# Patient Record
Sex: FEMALE | Race: WHITE
[De-identification: names, ages, dates, MRNs, and addresses within clinical notes are randomized per-mention and may not be internally consistent; named-entity substitution may affect disease eponyms.]

---

## 2020-10-16 ENCOUNTER — HOSPITAL ENCOUNTER (EMERGENCY)
Dept: HOSPITAL 11 - JP.ED | Age: 60
Discharge: HOME | End: 2020-10-16
Payer: COMMERCIAL

## 2020-10-16 VITALS — HEART RATE: 85 BPM | DIASTOLIC BLOOD PRESSURE: 96 MMHG | SYSTOLIC BLOOD PRESSURE: 125 MMHG

## 2020-10-16 DIAGNOSIS — Z90.710: ICD-10-CM

## 2020-10-16 DIAGNOSIS — R09.1: Primary | ICD-10-CM

## 2020-10-16 DIAGNOSIS — F17.210: ICD-10-CM

## 2020-10-16 DIAGNOSIS — Z79.899: ICD-10-CM

## 2020-10-16 DIAGNOSIS — R07.89: ICD-10-CM

## 2020-10-16 DIAGNOSIS — Z88.0: ICD-10-CM

## 2020-10-16 DIAGNOSIS — H55.89: ICD-10-CM

## 2020-10-16 DIAGNOSIS — F32.9: ICD-10-CM

## 2020-10-16 DIAGNOSIS — Z90.49: ICD-10-CM

## 2020-10-16 PROCEDURE — 36415 COLL VENOUS BLD VENIPUNCTURE: CPT

## 2020-10-16 PROCEDURE — 96374 THER/PROPH/DIAG INJ IV PUSH: CPT

## 2020-10-16 PROCEDURE — 93005 ELECTROCARDIOGRAM TRACING: CPT

## 2020-10-16 PROCEDURE — 80053 COMPREHEN METABOLIC PANEL: CPT

## 2020-10-16 PROCEDURE — 84484 ASSAY OF TROPONIN QUANT: CPT

## 2020-10-16 PROCEDURE — 71046 X-RAY EXAM CHEST 2 VIEWS: CPT

## 2020-10-16 PROCEDURE — 85379 FIBRIN DEGRADATION QUANT: CPT

## 2020-10-16 PROCEDURE — 85025 COMPLETE CBC W/AUTO DIFF WBC: CPT

## 2020-10-16 PROCEDURE — 99285 EMERGENCY DEPT VISIT HI MDM: CPT

## 2020-10-16 NOTE — EDM.PDOC
ED HPI GENERAL MEDICAL PROBLEM





- General


Chief Complaint: Chest Pain


Stated Complaint: CHEST PAIN, SHORTNESS OF BREATH, ARM PAIN


Time Seen by Provider: 10/16/20 16:00


Source of Information: Reports: Patient, Family


History Limitations: Reports: No Limitations





- History of Present Illness


INITIAL COMMENTS - FREE TEXT/NARRATIVE: 





60-year-old female who over the past 12 hours has developed a pleuritic-like 

pain radiating up through her chest into her shoulders, substernal area, and 

into the back of her neck.  It is very painful to sit up or lay down, if she 

lays still it is better.  It also hurts to take a deep breath.  She has no fever

or chills, no shortness of breath or diaphoresis.  Some pain radiates into the 

left arm.  No palpitations.  Denies abdominal pain.  No recent trauma, she does 

have some increase in activity recently.


Onset: Gradual


Duration: Hour(s): (12 hours)


Location: Reports: Neck, Chest, Other (Shoulders, left arm)


Worsens with: Reports: Breathing, Movement


Associated Symptoms: Reports: Chest Pain, Other (Pleuritic pain but not short of

breath).  Denies: Cough, Malaise, Nausea/Vomiting, Shortness of Breath, Weakness





- Related Data


                                    Allergies











Allergy/AdvReac Type Severity Reaction Status Date / Time


 


Penicillins Allergy  Hives Verified 10/16/20 15:46











Home Meds: 


                                    Home Meds





Sertraline [Zoloft] 100 mg PO DAILY 01/03/16 [History]











Past Medical History


Other Musculoskeletal History: left wrist pain


Psychiatric History: Reports: Depression





- Past Surgical History


GI Surgical History: Reports: Cholecystectomy


Female  Surgical History: Reports: Hysterectomy


Musculoskeletal Surgical History: Reports: None, Shoulder Surgery





Social & Family History





- Tobacco Use


Tobacco Use Status *Q: Current Every Day Tobacco User


Years of Tobacco use: 15


Packs/Tins Daily: 0.5





- Recreational Drug Use


Recreational Drug Use: No





- Living Situation & Occupation


Living situation: Reports: , with Family





ED ROS GENERAL





- Review of Systems


Review Of Systems: See Below


Constitutional: Denies: Fever, Chills


HEENT: Reports: Other (Disconjugate gaze which is chronic, left lazy eye)


Respiratory: Reports: Pleuritic Chest Pain.  Denies: Shortness of Breath


Cardiovascular: Reports: Chest Pain, Palpitations


GI/Abdominal: Denies: Abdominal Pain, Nausea, Vomiting


Skin: Reports: No Symptoms.  Denies: Rash, Erythema


Neurological: Denies: Dizziness, Headache, Difficulty Walking


Psychiatric: Reports: No Symptoms





ED EXAM, GENERAL





- Physical Exam


Exam: See Below


Exam Limited By: No Limitations


General Appearance: Alert, Anxious, Mild Distress


Eye Exam: Bilateral Eye: Other (Disconjugate gaze)


Head: Atraumatic


Neck: Other (She does have tenderness to palpation of the trapezius areas, 

increased pain with sitting up or deep breath)


Respiratory/Chest: No Respiratory Distress, Lungs Clear, Other (No anterior 

chest wall pain to palpation)


Cardiovascular: Regular Rate, Rhythm


GI/Abdominal: Soft, Non-Tender


Extremities: Other (Full range of motion of the shoulders without pain)


Neurological: Alert, Oriented, No Motor/Sensory Deficits


Psychiatric: Anxious


Skin Exam: Warm, Dry


  ** #1 Interpretation


EKG Date: 10/16/20


Rhythm: NSR


P-Wave: Present (EKG is normal)


ST-T: Normal


QT: Normal





Course





- Vital Signs


Last Recorded V/S: 


                                Last Vital Signs











Temp  98.5 F   10/16/20 15:47


 


Pulse  85   10/16/20 17:08


 


Resp  17   10/16/20 17:08


 


BP  125/96 H  10/16/20 17:08


 


Pulse Ox  97   10/16/20 17:08














- Orders/Labs/Meds


Orders: 


                               Active Orders 24 hr











 Category Date Time Status


 


 Chest 2V [CR] Routine Exams  10/16/20 16:06 Taken


 


 EKG 12 Lead [EK] Routine Ther  10/16/20 16:20 Ordered











Labs: 


                                Laboratory Tests











  10/16/20 10/16/20 10/16/20 Range/Units





  16:19 16:19 16:19 


 


WBC  14.0 H    (4.5-11.0)  K/uL


 


RBC  4.46    (3.30-5.50)  M/uL


 


Hgb  13.5    (12.0-15.0)  g/dL


 


Hct  40.8    (36.0-48.0)  %


 


MCV  92    (80-98)  fL


 


MCH  30    (27-31)  pg


 


MCHC  33    (32-36)  %


 


Plt Count  434 H    (150-400)  K/uL


 


Neut % (Auto)  77 H    (36-66)  %


 


Lymph % (Auto)  10 L    (24-44)  %


 


Mono % (Auto)  10 H    (2-6)  %


 


Eos % (Auto)  2    (2-4)  %


 


Baso % (Auto)  1    (0-1)  %


 


D-Dimer, Quantitative    842 H  (0.0-400.0)  ng/mL


 


Sodium   134 L   (140-148)  mmol/L


 


Potassium   4.1   (3.6-5.2)  mmol/L


 


Chloride   101   (100-108)  mmol/L


 


Carbon Dioxide   22   (21-32)  mmol/L


 


Anion Gap   15.1 H   (5.0-14.0)  mmol/L


 


BUN   16   (7-18)  mg/dL


 


Creatinine   1.0   (0.6-1.0)  mg/dL


 


Est Cr Clr Drug Dosing   53.16   mL/min


 


Estimated GFR (MDRD)   56 L   (>60)  


 


Glucose   146 H   ()  mg/dL


 


Calcium   8.4 L   (8.5-10.1)  mg/dL


 


Total Bilirubin   0.4   (0.2-1.0)  mg/dL


 


AST   17   (15-37)  U/L


 


ALT   37   (12-78)  U/L


 


Alkaline Phosphatase   63   ()  U/L


 


Troponin I   < 0.017   (0.000-0.056)  ng/mL


 


Total Protein   7.3   (6.4-8.2)  g/dL


 


Albumin   2.8 L   (3.4-5.0)  g/dL


 


Globulin   4.5 H   (2.3-3.5)  g/dL


 


Albumin/Globulin Ratio   0.6 L   (1.2-2.2)  











Meds: 


Medications














Discontinued Medications














Generic Name Dose Route Start Last Admin





  Trade Name Colinq  PRN Reason Stop Dose Admin


 


Ketorolac Tromethamine  30 mg  10/16/20 16:07  10/16/20 16:36





  Toradol  IVPUSH  10/16/20 16:08  30 mg





  ONETIME ONE   Administration














- Re-Assessments/Exams


Free Text/Narrative Re-Assessment/Exam: 





10/16/20 17:46


EKG is normal initially.  A two-view chest x-ray was obtained that showed some 

blunted costophrenic angles posteriorly but otherwise normal.  Patient was given

30 mg of IV Toradol while awaiting CBC BMP and d-dimer and troponin.  Her pain 

almost completely resolved with the Toradol, troponin was 0 and d-dimer was 800.

 This is elevated but not significant enough for CT of the chest.  CBC and BMP 

were otherwise reassuring.  Patient will continue on Toradol through the weekend

every 6 hours, return if worsening and recheck next week if not improving.  

Diagnosis and information on pleurisy was given.








Departure





- Departure


Time of Disposition: 17:26


Disposition: Home, Self-Care 01


Clinical Impression: 


 Pleurisy, Atypical chest pain








- Discharge Information


Instructions:  Pleurisy, Easy-to-Read


Referrals: 


PCP,None [Primary Care Provider] - 


Forms:  ED Department Discharge


Care Plan Goals: 


Take 1 pill of ketorolac every 6 hours through the weekend and increase activity

as tolerated.  Continue any other medications you normally take, and return 

anytime if worsening such as shortness of breath, increased pain or fever.  

Otherwise consider rechecking next week if not improving satisfactorily.





Sepsis Event Note (ED)





- Evaluation


Sepsis Screening Result: No Definite Risk





- Focused Exam


Vital Signs: 


                                   Vital Signs











  Temp Pulse Resp BP Pulse Ox


 


 10/16/20 17:08   85  17  125/96 H  97


 


 10/16/20 16:33   81  24 H  119/78  98


 


 10/16/20 15:47  98.5 F  79  22 H  130/89  98














- My Orders


Last 24 Hours: 


My Active Orders





10/16/20 16:06


Chest 2V [CR] Routine 





10/16/20 16:20


EKG 12 Lead [EK] Routine 














- Assessment/Plan


Last 24 Hours: 


My Active Orders





10/16/20 16:06


Chest 2V [CR] Routine 





10/16/20 16:20


EKG 12 Lead [EK] Routine

## 2020-10-19 NOTE — CR
CHEST: 2 view

 

CLINICAL HISTORY:Dyspnea

 

COMPARISON:2016

 

FINDINGS:  The heart size, pulmonary vascularity and hilar structures are

normal. There is some patchy left infrahilar density not seen on prior study.

There is some blunting of the left costophrenic angle.

 

IMPRESSION: Patchy left lower lobe density may represent some patchy atelectasis

or pneumonic infiltrate.

 

Minimal left effusion